# Patient Record
Sex: MALE | URBAN - METROPOLITAN AREA
[De-identification: names, ages, dates, MRNs, and addresses within clinical notes are randomized per-mention and may not be internally consistent; named-entity substitution may affect disease eponyms.]

---

## 2020-08-24 ENCOUNTER — OFFICE VISIT (OUTPATIENT)
Dept: URBAN - METROPOLITAN AREA CLINIC 98 | Facility: CLINIC | Age: 60
End: 2020-08-24
Payer: COMMERCIAL

## 2020-08-24 ENCOUNTER — DASHBOARD ENCOUNTERS (OUTPATIENT)
Age: 60
End: 2020-08-24

## 2020-08-24 DIAGNOSIS — Z83.79 FAMILY HISTORY OF CIRRHOSIS OF LIVER: ICD-10-CM

## 2020-08-24 DIAGNOSIS — R74.8 ELEVATED LIVER ENZYMES: ICD-10-CM

## 2020-08-24 DIAGNOSIS — R79.0 ABNORMAL BLOOD LEVEL OF IRON: ICD-10-CM

## 2020-08-24 PROCEDURE — 99204 OFFICE O/P NEW MOD 45 MIN: CPT | Performed by: INTERNAL MEDICINE

## 2020-08-24 PROCEDURE — G8427 DOCREV CUR MEDS BY ELIG CLIN: HCPCS | Performed by: INTERNAL MEDICINE

## 2020-08-24 PROCEDURE — G8420 CALC BMI NORM PARAMETERS: HCPCS | Performed by: INTERNAL MEDICINE

## 2020-08-24 PROCEDURE — G9903 PT SCRN TBCO ID AS NON USER: HCPCS | Performed by: INTERNAL MEDICINE

## 2020-08-24 PROCEDURE — 81256 HFE GENE: CPT | Performed by: INTERNAL MEDICINE

## 2020-08-24 PROCEDURE — 3017F COLORECTAL CA SCREEN DOC REV: CPT | Performed by: INTERNAL MEDICINE

## 2020-08-24 PROCEDURE — 82977 ASSAY OF GGT: CPT | Performed by: INTERNAL MEDICINE

## 2020-08-24 NOTE — HPI-TODAY'S VISIT:
HEre on referral from Dr Eubanks for elevated liver tests since at least  -  1998 ggt 136 for life insurance   ggt 311 - [place] "totally fine" "liver of a 10 year old"  - ggt 485 alp 156 alt 41  - [place] w hemangioma  - ggt 503 . mother  44 yo - complications of alcoholism - strong in that side of family  did find cirrhosis on autopsy  her father - also alcohol, heart issue.  lived to 70s  . pt drinks wine -no hard liquor - 2-3 glasses / week for years denies OTC meds / supplements.

## 2020-09-04 LAB
A/G RATIO: 1.7
A1A RFX TO PHENOTYPE: (no result)
AAT, DNA ANALYSIS: (no result)
ADDITIONAL INFORMATION:: (no result)
ALBUMIN: 4.7
ALKALINE PHOSPHATASE: 186
ALPHA-1-ANTITRYPSIN, SERUM: 133
ALT (SGPT): 47
AST (SGOT): 30
BASO (ABSOLUTE): 0
BASOS: 0
BILIRUBIN, TOTAL: 0.4
BONE FRACTION:: 39
BUN/CREATININE RATIO: 17
BUN: 15
CALCIUM: 9.5
CARBON DIOXIDE, TOTAL: 24
CHLORIDE: 100
CREATININE: 0.89
EGFR IF AFRICN AM: 106
EGFR IF NONAFRICN AM: 92
EOS (ABSOLUTE): 0
EOS: 1
FERRITIN, SERUM: 57
GGT: 470
GLOBULIN, TOTAL: 2.8
GLUCOSE: 103
HEMATOCRIT: 42.5
HEMATOLOGY COMMENTS:: (no result)
HEMOGLOBIN: 14.5
HEREDITARY  HEMOCHROMATOSIS: (no result)
IMMATURE CELLS: (no result)
IMMATURE GRANS (ABS): 0
IMMATURE GRANULOCYTES: 1
IMMUNOGLOBULIN A, QN, SERUM: 400
IMMUNOGLOBULIN G, QN, SERUM: 1101
IMMUNOGLOBULIN M, QN, SERUM: 152
INTESTINAL FRAC.:: 8
IRON BIND.CAP.(TIBC): 335
IRON SATURATION: 21
IRON: 70
LIVER FRACTION:: 53
LYMPHS (ABSOLUTE): 1.1
LYMPHS: 28
Lab: (no result)
MCH: 31.2
MCHC: 34.1
MCV: 91
MITOCHONDRIAL (M2) ANTIBODY: <20
MONOCYTES(ABSOLUTE): 0.4
MONOCYTES: 10
NEUTROPHILS (ABSOLUTE): 2.3
NEUTROPHILS: 60
NRBC: (no result)
PLATELETS: 273
POTASSIUM: 3.8
PROTEIN, TOTAL: 7.5
RBC: 4.65
RDW: 12.8
SODIUM: 138
UIBC: 265
WBC: 3.8

## 2020-09-10 ENCOUNTER — TELEPHONE ENCOUNTER (OUTPATIENT)
Dept: URBAN - METROPOLITAN AREA CLINIC 98 | Facility: CLINIC | Age: 60
End: 2020-09-10

## 2020-09-10 RX ORDER — LORAZEPAM 1 MG/1
1-2 TABS AS NEEDED FOR ANXIETY TABLET ORAL
Qty: 4 | Refills: 0 | OUTPATIENT
Start: 2020-09-10

## 2020-09-10 NOTE — HPI-TODAY'S VISIT:
discussed w him  at length  liver bx vs mri  prefer non invasive although has claustrophobia  also he has to go see Urology at the end of oct for elev PSA  may need MRI - will see if he needs MRI pelvis for prostate as well as MRI abdomen  he will andrea back after that visit  also needs colonoscopy] - will schedule that after all this is taken care of

## 2021-04-06 PROBLEM — 274866006 ABNORMAL LEVEL OF BLOOD MINERAL: Status: ACTIVE | Noted: 2020-08-29

## 2021-04-06 PROBLEM — 5780001000004109 FAMILY HISTORY OF CIRRHOSIS OF LIVER: Status: ACTIVE | Noted: 2020-08-29

## 2021-04-06 PROBLEM — 707724006 ELEVATED LIVER ENZYMES LEVEL: Status: ACTIVE | Noted: 2020-08-29

## 2021-04-21 ENCOUNTER — OFFICE VISIT (OUTPATIENT)
Dept: URBAN - METROPOLITAN AREA SURGERY CENTER 18 | Facility: SURGERY CENTER | Age: 61
End: 2021-04-21
Payer: COMMERCIAL

## 2021-04-21 ENCOUNTER — CLAIMS CREATED FROM THE CLAIM WINDOW (OUTPATIENT)
Dept: URBAN - METROPOLITAN AREA CLINIC 4 | Facility: CLINIC | Age: 61
End: 2021-04-21
Payer: COMMERCIAL

## 2021-04-21 DIAGNOSIS — D12.5 ADENOMA OF SIGMOID COLON: ICD-10-CM

## 2021-04-21 DIAGNOSIS — Z12.11 COLON CANCER SCREENING: ICD-10-CM

## 2021-04-21 DIAGNOSIS — K63.89 BACTERIAL OVERGROWTH SYNDROME: ICD-10-CM

## 2021-04-21 DIAGNOSIS — D12.5 BENIGN NEOPLASM OF SIGMOID COLON: ICD-10-CM

## 2021-04-21 DIAGNOSIS — D12.3 ADENOMA OF TRANSVERSE COLON: ICD-10-CM

## 2021-04-21 DIAGNOSIS — K63.89 STENOSIS OF ILEOCECAL VALVE: ICD-10-CM

## 2021-04-21 DIAGNOSIS — D12.3 BENIGN NEOPLASM OF TRANSVERSE COLON: ICD-10-CM

## 2021-04-21 PROCEDURE — 45380 COLONOSCOPY AND BIOPSY: CPT | Performed by: INTERNAL MEDICINE

## 2021-04-21 PROCEDURE — 45385 COLONOSCOPY W/LESION REMOVAL: CPT | Performed by: INTERNAL MEDICINE

## 2021-04-21 PROCEDURE — G8907 PT DOC NO EVENTS ON DISCHARG: HCPCS | Performed by: INTERNAL MEDICINE

## 2021-04-21 PROCEDURE — 88305 TISSUE EXAM BY PATHOLOGIST: CPT | Performed by: PATHOLOGY

## 2021-04-21 RX ORDER — LORAZEPAM 1 MG/1
1-2 TABS AS NEEDED FOR ANXIETY TABLET ORAL
Qty: 4 | Refills: 0 | Status: ACTIVE | COMMUNITY
Start: 2020-09-10

## 2024-07-05 ENCOUNTER — LAB OUTSIDE AN ENCOUNTER (OUTPATIENT)
Dept: URBAN - METROPOLITAN AREA SURGERY CENTER 18 | Facility: SURGERY CENTER | Age: 64
End: 2024-07-05

## 2024-07-08 ENCOUNTER — OFFICE VISIT (OUTPATIENT)
Dept: URBAN - METROPOLITAN AREA SURGERY CENTER 18 | Facility: SURGERY CENTER | Age: 64
End: 2024-07-08
Payer: COMMERCIAL

## 2024-07-08 ENCOUNTER — CLAIMS CREATED FROM THE CLAIM WINDOW (OUTPATIENT)
Dept: URBAN - METROPOLITAN AREA CLINIC 4 | Facility: CLINIC | Age: 64
End: 2024-07-08
Payer: COMMERCIAL

## 2024-07-08 DIAGNOSIS — Z86.010 ADENOMAS PERSONAL HISTORY OF COLONIC POLYPS: ICD-10-CM

## 2024-07-08 DIAGNOSIS — K63.89 OTHER SPECIFIED DISEASES OF INTESTINE: ICD-10-CM

## 2024-07-08 DIAGNOSIS — K63.5 POLYP OF COLON: ICD-10-CM

## 2024-07-08 DIAGNOSIS — K63.5 BENIGN COLON POLYP: ICD-10-CM

## 2024-07-08 DIAGNOSIS — D12.4 ADENOMATOUS POLYP OF DESCENDING COLON: ICD-10-CM

## 2024-07-08 DIAGNOSIS — K64.8 INTERNAL HEMORRHOIDS: ICD-10-CM

## 2024-07-08 DIAGNOSIS — D12.8 ADENOMATOUS POLYP OF RECTUM: ICD-10-CM

## 2024-07-08 DIAGNOSIS — Z86.010 PERSONAL HISTORY OF COLON POLYPS: ICD-10-CM

## 2024-07-08 DIAGNOSIS — K62.1 ANAL AND RECTAL POLYP: ICD-10-CM

## 2024-07-08 DIAGNOSIS — Z09 ENCNTR FOR F/U EXAM AFT TRTMT FOR COND OTH THAN MALIG NEOPLM: ICD-10-CM

## 2024-07-08 PROCEDURE — 88305 TISSUE EXAM BY PATHOLOGIST: CPT | Performed by: PATHOLOGY

## 2024-07-08 PROCEDURE — 00812 ANES LWR INTST SCR COLSC: CPT | Performed by: NURSE ANESTHETIST, CERTIFIED REGISTERED

## 2024-07-08 PROCEDURE — 0529F INTRVL 3/>YR PTS CLNSCP DOCD: CPT | Performed by: INTERNAL MEDICINE

## 2024-07-08 PROCEDURE — 45380 COLONOSCOPY AND BIOPSY: CPT | Performed by: INTERNAL MEDICINE

## 2024-07-08 RX ORDER — LORAZEPAM 1 MG/1
1-2 TABS AS NEEDED FOR ANXIETY TABLET ORAL
Qty: 4 | Refills: 0 | Status: ACTIVE | COMMUNITY
Start: 2020-09-10